# Patient Record
Sex: MALE | Race: OTHER | Employment: UNEMPLOYED | URBAN - METROPOLITAN AREA
[De-identification: names, ages, dates, MRNs, and addresses within clinical notes are randomized per-mention and may not be internally consistent; named-entity substitution may affect disease eponyms.]

---

## 2022-04-08 ENCOUNTER — HOSPITAL ENCOUNTER (EMERGENCY)
Age: 37
Discharge: HOME | End: 2022-04-08

## 2022-04-08 VITALS
HEART RATE: 83 BPM | DIASTOLIC BLOOD PRESSURE: 93 MMHG | SYSTOLIC BLOOD PRESSURE: 145 MMHG | OXYGEN SATURATION: 99 % | WEIGHT: 163.5 LBS | RESPIRATION RATE: 20 BRPM | TEMPERATURE: 98.2 F

## 2022-04-08 DIAGNOSIS — R03.0 ELEVATED BLOOD PRESSURE READING WITHOUT DIAGNOSIS OF HYPERTENSION: ICD-10-CM

## 2022-04-08 DIAGNOSIS — R04.0 EPISTAXIS: Primary | ICD-10-CM

## 2022-04-08 DIAGNOSIS — J34.89 INTERNAL NASAL LESION: ICD-10-CM

## 2022-04-08 PROCEDURE — 99283 EMERGENCY DEPT VISIT LOW MDM: CPT

## 2022-04-08 RX ORDER — OXYMETAZOLINE HYDROCHLORIDE 0.05 G/100ML
1 SPRAY NASAL 2 TIMES DAILY
Qty: 15 ML | Refills: 0 | Status: SHIPPED | OUTPATIENT
Start: 2022-04-08 | End: 2022-04-11

## 2022-04-08 RX ORDER — GINSENG 100 MG
CAPSULE ORAL
Qty: 28 G | Refills: 0 | Status: SHIPPED | OUTPATIENT
Start: 2022-04-08 | End: 2022-04-08 | Stop reason: SDUPTHER

## 2022-04-08 RX ORDER — GINSENG 100 MG
CAPSULE ORAL
Qty: 28 G | Refills: 0 | Status: SHIPPED | OUTPATIENT
Start: 2022-04-08 | End: 2022-04-18

## 2022-04-08 RX ORDER — OXYMETAZOLINE HYDROCHLORIDE 0.05 G/100ML
1 SPRAY NASAL 2 TIMES DAILY
Qty: 15 ML | Refills: 0 | Status: SHIPPED | OUTPATIENT
Start: 2022-04-08 | End: 2022-04-08 | Stop reason: SDUPTHER

## 2022-04-08 RX ORDER — SULFAMETHOXAZOLE AND TRIMETHOPRIM 800; 160 MG/1; MG/1
1 TABLET ORAL 2 TIMES DAILY
Qty: 14 TABLET | Refills: 0 | Status: SHIPPED | OUTPATIENT
Start: 2022-04-08 | End: 2022-04-15

## 2022-04-08 RX ORDER — SULFAMETHOXAZOLE AND TRIMETHOPRIM 800; 160 MG/1; MG/1
1 TABLET ORAL 2 TIMES DAILY
Qty: 14 TABLET | Refills: 0 | Status: SHIPPED | OUTPATIENT
Start: 2022-04-08 | End: 2022-04-08 | Stop reason: SDUPTHER

## 2022-04-08 ASSESSMENT — PAIN DESCRIPTION - ONSET: ONSET: ON-GOING

## 2022-04-08 ASSESSMENT — PAIN DESCRIPTION - DESCRIPTORS: DESCRIPTORS: ACHING

## 2022-04-08 ASSESSMENT — PAIN SCALES - GENERAL: PAINLEVEL_OUTOF10: 10

## 2022-04-08 ASSESSMENT — PAIN DESCRIPTION - PROGRESSION: CLINICAL_PROGRESSION: NOT CHANGED

## 2022-04-08 ASSESSMENT — PAIN - FUNCTIONAL ASSESSMENT: PAIN_FUNCTIONAL_ASSESSMENT: 0-10

## 2022-04-09 NOTE — ED NOTES
Patient discharged using . Patient verbalized understanding, all questions answered at this time.       Lalit Interiano RN  04/08/22 0572

## 2022-04-09 NOTE — ED PROVIDER NOTES
[facility]        Pt Name: Alex Romero  MRN: [id number]  [birth year]gfgrace 1985  Date of evaluation: 4/8/2022  Provider: Caitlin Man PA-C  PCP: No primary care provider on file. Note Started: 12:53 AM EDT       CHRISTOS. I have evaluated this patient. My supervising physician was available for consultation. Giorgio Tomas      CHIEF COMPLAINT       Chief Complaint   Patient presents with    Headache     Headache and nosebleed. Nose bleed for 20 days. 10/10 headache for 20 days. Denies Hx of HTN.  Epistaxis       HISTORY OF PRESENT ILLNESS   (Location, Timing/Onset, Context/Setting, Quality, Duration, Modifying Factors, Severity, Associated Signs and Symptoms)  Note limiting factors. Chief Complaint: Nosebleed    Alec Cortez is a 31 y.o. male who presents with primary complaint of nosebleed right equals left. No dominance. Not actively bleeding. Intermittent over the past 20 days. Occasional headache for the past 20 days. Elevated blood pressure per patient. Reporting no chest pain or shortness of breath. Not on anticoagulants. States he has some sores in his nose. The patient blood pressure 145/93. Not diagnosed hypertension and not on medication. Nursing Notes were all reviewed and agreed with or any disagreements were addressed in the HPI. REVIEW OF SYSTEMS    (2-9 systems for level 4, 10 or more for level 5)     Review of Systems    Positives and Pertinent negatives as per HPI. Except as noted above in the ROS, all other systems were reviewed and negative. PAST MEDICAL HISTORY   History reviewed. No pertinent past medical history. SURGICAL HISTORY   History reviewed. No pertinent surgical history. [address]       Discharge Medication List as of 4/8/2022 11:38 PM            ALLERGIES     Patient has no known allergies.     FAMILYHISTORY     History reviewed. No pertinent family history. SOCIAL HISTORY       Social History     Tobacco Use    Smoking status: Not on file    Smokeless tobacco: Never Used   Vaping Use    Vaping Use: Never used   Substance Use Topics    Alcohol use: Not Currently    Drug use: Never       SCREENINGS    Pranav Coma Scale  Eye Opening: Spontaneous  Best Verbal Response: Oriented  Best Motor Response: Obeys commands  Joffre Coma Scale Score: 15        PHYSICAL EXAM    (up to 7 for level 4, 8 or more for level 5)     ED Triage Vitals [04/08/22 2021]   BP Temp Temp Source Pulse Resp SpO2 Height Weight   (!) 145/93 98.2 °F (36.8 °C) Oral 83 20 99 % -- 163 lb 8 oz (74.2 kg)       Physical Exam  Vitals and nursing note reviewed. Constitutional:       Appearance: Normal appearance. He is well-developed and normal weight. HENT:      Head: Normocephalic and atraumatic. Right Ear: External ear normal.      Left Ear: External ear normal.      Nose:      Comments: Examination reveals dried blood both nares right more than left. Dry mucosa. Nasal septal vascular prominence noted. No active bleeding. Questionable sore noted. No obvious lesion. Eyes:      General: No scleral icterus. Right eye: No discharge. Left eye: No discharge. Conjunctiva/sclera: Conjunctivae normal.   Cardiovascular:      Rate and Rhythm: Normal rate. Pulmonary:      Effort: Pulmonary effort is normal.   Musculoskeletal:         General: Normal range of motion. Cervical back: Normal range of motion and neck supple. Skin:     General: Skin is warm and dry. Neurological:      General: No focal deficit present. Mental Status: He is alert and oriented to person, place, and time. Mental status is at baseline. Psychiatric:         Mood and Affect: Mood normal.         Behavior: Behavior normal.         Thought Content:  Thought content normal.         Judgment: Judgment normal.         DIAGNOSTIC RESULTS   LABS:    Labs Reviewed - No data to display    When ordered only abnormal lab results are displayed. All other labs were within normal range or not returned as of this dictation. EKG: When ordered, EKG's are interpreted by the Emergency Department Physician in the absence of a cardiologist.  Please see their note for interpretation of EKG. RADIOLOGY:   Non-plain film images such as CT, Ultrasound and MRI are read by the radiologist. Plain radiographic images are visualized and preliminarily interpreted by the ED Provider with the below findings:        Interpretation per the Radiologist below, if available at the time of this note:    No orders to display     No results found. PROCEDURES   Unless otherwise noted below, none     Procedures    CRITICAL CARE TIME       CONSULTS:  None      EMERGENCY DEPARTMENT COURSE and DIFFERENTIAL DIAGNOSIS/MDM:   Vitals:    Vitals:    04/08/22 2021   BP: (!) 145/93   Pulse: 83   Resp: 20   Temp: 98.2 °F (36.8 °C)   TempSrc: Oral   SpO2: 99%   Weight: 163 lb 8 oz (74.2 kg)       Patient was given the following medications:  Medications - No data to display        The patient presenting with recurrent epistaxis. The patient concerned about blood pressure. Occasional headache reported. On examination patient had dry nasal mucosa and vascular prominence on the anterior septal region bilaterally. Some dried blood noted in the right nasal passage. Patient reassured. I have referred to PCP for further evaluation of blood pressure. No headache reported at this time. No active bleeding at this time. I did have patient blow his nose. No blood or clot material expressed. He does have airflow through both nasal passages. I did prescribe Bactrim and bacitracin. I did prescribe Afrin to be used twice daily x3 days then discontinue. I recommended OTC nasal saline. Patient to contact ENT on Monday for an appointment next week at the [facility]. I did use  #[id number]. The patient did express understanding of his diagnosis and the treatment plan. FINAL IMPRESSION      1. Epistaxis    2. Internal nasal lesion    3. Elevated blood pressure reading without diagnosis of hypertension          DISPOSITION/PLAN   DISPOSITION Decision To Discharge 04/08/2022 11:06:04 PM      PATIENT REFERRED TO:  Karly Guzman MD  [address] [place] [address]  [address] [place] [address] [address]  [address] [facility] [address]  [phone]    On 4/12/2022      [facility] Emergency Department  [address] [facility]  [address] [address]  [phone]  Go to   If symptoms worsen    [facility]) Pre-Services  [phone]          DISCHARGE MEDICATIONS:  Discharge Medication List as of 4/8/2022 11:38 PM      START taking these medications    Details   sulfamethoxazole-trimethoprim (BACTRIM DS) 800-160 MG per tablet Take 1 tablet by mouth 2 times daily for 7 days, Disp-14 tablet, R-0Print      bacitracin 500 UNIT/GM ointment Apply intranasal/topically 2 times daily at at bedtime. , Disp-28 g, R-0, Print      oxymetazoline (12 HOUR NASAL SPRAY) 0.05 % nasal spray 1 spray by Nasal route 2 times daily for 3 days, Disp-15 mL, R-0Print             DISCONTINUED MEDICATIONS:  Discharge Medication List as of 4/8/2022 11:38 PM                 (Please note that portions of this note were completed with a voice recognition program.  Efforts were made to edit the dictations but occasionally words are mis-transcribed. )    Andreas Dorsey PA-C (electronically signed)           Andreas Dorsey PA-C  04/09/22 1920